# Patient Record
Sex: FEMALE
[De-identification: names, ages, dates, MRNs, and addresses within clinical notes are randomized per-mention and may not be internally consistent; named-entity substitution may affect disease eponyms.]

---

## 2023-03-02 PROBLEM — Z00.00 ENCOUNTER FOR PREVENTIVE HEALTH EXAMINATION: Status: ACTIVE | Noted: 2023-03-02

## 2023-03-08 ENCOUNTER — TRANSCRIPTION ENCOUNTER (OUTPATIENT)
Age: 73
End: 2023-03-08

## 2023-03-09 ENCOUNTER — TRANSCRIPTION ENCOUNTER (OUTPATIENT)
Age: 73
End: 2023-03-09

## 2023-03-09 ENCOUNTER — APPOINTMENT (OUTPATIENT)
Dept: CARDIOLOGY | Facility: CLINIC | Age: 73
End: 2023-03-09

## 2023-03-09 ENCOUNTER — OUTPATIENT (OUTPATIENT)
Dept: EMERGENCY DEPT | Facility: HOSPITAL | Age: 73
LOS: 1 days | End: 2023-03-09
Payer: MEDICARE

## 2023-03-09 VITALS
RESPIRATION RATE: 14 BRPM | HEART RATE: 54 BPM | TEMPERATURE: 98 F | DIASTOLIC BLOOD PRESSURE: 86 MMHG | SYSTOLIC BLOOD PRESSURE: 165 MMHG

## 2023-03-09 VITALS
RESPIRATION RATE: 12 BRPM | DIASTOLIC BLOOD PRESSURE: 63 MMHG | HEART RATE: 63 BPM | OXYGEN SATURATION: 100 % | SYSTOLIC BLOOD PRESSURE: 152 MMHG

## 2023-03-09 DIAGNOSIS — Z90.710 ACQUIRED ABSENCE OF BOTH CERVIX AND UTERUS: Chronic | ICD-10-CM

## 2023-03-09 DIAGNOSIS — H33.021 RETINAL DETACHMENT WITH MULTIPLE BREAKS, RIGHT EYE: ICD-10-CM

## 2023-03-09 DIAGNOSIS — H33.21 SEROUS RETINAL DETACHMENT, RIGHT EYE: ICD-10-CM

## 2023-03-09 LAB
ANION GAP SERPL CALC-SCNC: 8 MMOL/L — SIGNIFICANT CHANGE UP (ref 5–17)
BUN SERPL-MCNC: 14 MG/DL — SIGNIFICANT CHANGE UP (ref 7–23)
CALCIUM SERPL-MCNC: 9 MG/DL — SIGNIFICANT CHANGE UP (ref 8.4–10.5)
CHLORIDE SERPL-SCNC: 105 MMOL/L — SIGNIFICANT CHANGE UP (ref 96–108)
CO2 SERPL-SCNC: 31 MMOL/L — SIGNIFICANT CHANGE UP (ref 22–31)
CREAT SERPL-MCNC: 0.73 MG/DL — SIGNIFICANT CHANGE UP (ref 0.5–1.3)
EGFR: 87 ML/MIN/1.73M2 — SIGNIFICANT CHANGE UP
GLUCOSE SERPL-MCNC: 102 MG/DL — HIGH (ref 70–99)
HCT VFR BLD CALC: 40.8 % — SIGNIFICANT CHANGE UP (ref 34.5–45)
HGB BLD-MCNC: 13.2 G/DL — SIGNIFICANT CHANGE UP (ref 11.5–15.5)
MCHC RBC-ENTMCNC: 28.3 PG — SIGNIFICANT CHANGE UP (ref 27–34)
MCHC RBC-ENTMCNC: 32.4 GM/DL — SIGNIFICANT CHANGE UP (ref 32–36)
MCV RBC AUTO: 87.4 FL — SIGNIFICANT CHANGE UP (ref 80–100)
NRBC # BLD: 0 /100 WBCS — SIGNIFICANT CHANGE UP (ref 0–0)
PLATELET # BLD AUTO: 296 K/UL — SIGNIFICANT CHANGE UP (ref 150–400)
POTASSIUM SERPL-MCNC: 3.9 MMOL/L — SIGNIFICANT CHANGE UP (ref 3.5–5.3)
POTASSIUM SERPL-SCNC: 3.9 MMOL/L — SIGNIFICANT CHANGE UP (ref 3.5–5.3)
RBC # BLD: 4.67 M/UL — SIGNIFICANT CHANGE UP (ref 3.8–5.2)
RBC # FLD: 12.6 % — SIGNIFICANT CHANGE UP (ref 10.3–14.5)
SODIUM SERPL-SCNC: 144 MMOL/L — SIGNIFICANT CHANGE UP (ref 135–145)
WBC # BLD: 6.18 K/UL — SIGNIFICANT CHANGE UP (ref 3.8–10.5)
WBC # FLD AUTO: 6.18 K/UL — SIGNIFICANT CHANGE UP (ref 3.8–10.5)

## 2023-03-09 PROCEDURE — 36415 COLL VENOUS BLD VENIPUNCTURE: CPT

## 2023-03-09 PROCEDURE — 85027 COMPLETE CBC AUTOMATED: CPT

## 2023-03-09 PROCEDURE — 93005 ELECTROCARDIOGRAM TRACING: CPT

## 2023-03-09 PROCEDURE — 67108 REPAIR DETACHED RETINA: CPT | Mod: RT

## 2023-03-09 PROCEDURE — C1889: CPT

## 2023-03-09 PROCEDURE — C1784: CPT

## 2023-03-09 PROCEDURE — 80048 BASIC METABOLIC PNL TOTAL CA: CPT

## 2023-03-09 DEVICE — GS C3F8 PERFLUOROPROPANE IOL 2.5 L 20GM: Type: IMPLANTABLE DEVICE | Site: RIGHT | Status: FUNCTIONAL

## 2023-03-09 DEVICE — LASER PROBE 25G CONSTELLATION: Type: IMPLANTABLE DEVICE | Site: RIGHT | Status: FUNCTIONAL

## 2023-03-09 DEVICE — PERFLUORON 7ML KIT: Type: IMPLANTABLE DEVICE | Site: RIGHT | Status: FUNCTIONAL

## 2023-03-09 NOTE — H&P PST ADULT - NSICDXPASTMEDICALHX_GEN_ALL_CORE_FT
PAST MEDICAL HISTORY:  High cholesterol     History of angina     Hypertension     Ovarian cancer     Small vessel coronary artery disease

## 2023-03-09 NOTE — H&P PST ADULT - HISTORY OF PRESENT ILLNESS
this is a 74 y/o female who had shading right lower eye, diagnosed with retinal holes and retinal detachment right eye, to have repair of same

## 2023-03-09 NOTE — ASU DISCHARGE PLAN (ADULT/PEDIATRIC) - CARE PROVIDER_API CALL
Ricco Charles (MD)  Ophthalmology  [address] [place] [address], [address]  [place] [address]  Phone: [phone]  Fax: [phone]  Scheduled Appointment: 03/10/2023 01:30 PM

## 2023-03-09 NOTE — ED ADULT NURSE NOTE - LAST KNOWN WELL DATE/TIME
[facility] Xolair Allergy Control Test    Prescribing Physician:    Was patient administered Xolair on appointed administration date? [] yes [x] no    Reason for not administering Xolair :[] Illness [] No show [x] Other:    Was there any reaction to mediation? [] yes [x] no    If Yes: Reactions:      1. In the past 4 weeks, how much of the time did your asthma keep you from getting as much done as usual at work, school or at home? [] 1 [] 2 [x] 3 [] 4 [] 5   Score:_3_____    2. During the past 4 weeks, how often have you had shortness of breath? [] 1 [] 2 [x] 3 [] 4 [] 5   Score:_3_____    3. During the past four weeks, how often did your asthma symptoms (wheezing, coughing, shortness of breath, chest tightness, or pain) wake you up at night or earlier than usual in the morning? [] 1 [] 2 [] 3 [x] 4 [] 5   Score:___4___    4. During the past four weeks, how often have you used your rescue inhaler or nebulizer medication? [] 1 [] 2 [x] 3 [] 4 [] 5   Score:___3___    5. How would you rate your asthma control during the past 4 weeks? [] 1 [] 2 [x] 3 [] 4 [] 5   Score:__3____        Total Score:_____16___________        To score the Asthma control test: Each response to the 5 ACT questions has a point value from 1-5 as shown on the form. To score the ACT, add up the point value for each response to the 5 questions. 02-Mar-2023 00:00

## 2023-03-09 NOTE — H&P PST ADULT - NSANTHOSAYNRD_GEN_A_CORE
No. JAMISON screening performed.  STOP BANG Legend: 0-2 = LOW Risk; 3-4 = INTERMEDIATE Risk; 5-8 = HIGH Risk

## 2023-03-09 NOTE — ASU DISCHARGE PLAN (ADULT/PEDIATRIC) - NS MD DC FALL RISK RISK
For information on Fall & Injury Prevention, visit: [email or web address] OR  [email or web address] OR  https://www.cdc.gov/steadi/patient.html

## 2023-03-09 NOTE — ED ADULT TRIAGE NOTE - CHIEF COMPLAINT QUOTE
sent for medical clearance for right eye retinal detachment surgery, right eye shade rising on side x 1 week, right eye-20/40, left eye-20/25

## 2023-03-09 NOTE — ED PROVIDER NOTE - CLINICAL SUMMARY MEDICAL DECISION MAKING FREE TEXT BOX
Patient is a 73-year-old female who presents to the emergency room for reported retinal detachment.  Positive history of hyperlipidemia hypertension 5 disease.  Previous history of ovarian cancer status post complete hysterectomy.  Patient reports approximately 6 to 7 days ago she noted an opiate surgical in her right lower lateral visual field which has progressively worsened was seen by ophthalmology and diagnosed with a retinal detachment.  Denies headache.  Reports wears glasses denies any visual changes to left eye.  Denies chest pain shortness of breath or abdominal pain. Pt presenting to the ED for confirmed retinal detachment here for admission for operative repair. Will obtain pre op labs, EKG and will admit. Independent review of EKG reveals NSR at 66 bpm

## 2023-03-09 NOTE — ED PROVIDER NOTE - OBJECTIVE STATEMENT
Patient is a 73-year-old female who presents to the emergency room for reported retinal detachment.  Positive history of hyperlipidemia hypertension 5 disease.  Previous history of ovarian cancer status post complete hysterectomy.  Patient reports approximately 6 to 7 days ago she noted an opiate surgical in her right lower lateral visual field which has progressively worsened was seen by ophthalmology and diagnosed with a retinal detachment.  Denies headache.  Reports wears glasses denies any visual changes to left eye.  Denies chest pain shortness of breath or abdominal pain.

## 2023-03-09 NOTE — ED ADULT NURSE NOTE - NS ED NURSE RECORD ANOTHER VITAL SIGN
Plan: Start Bactrim DS take one pill twice a day for a month then continue with Minocycline 100mg one tablet twice a day Initiate Treatment: Bactrim DS take one tablet twice a day for a month Detail Level: Detailed Discontinue Regimen: Minocycline 100mg twice a day for a month Continue Regimen: Aczone 7.5% apply to face in the morning after washing\\nEpiduo forte apply to face at night after washing Yes

## 2023-08-21 ENCOUNTER — OFFICE (OUTPATIENT)
Dept: URBAN - METROPOLITAN AREA CLINIC 8 | Facility: CLINIC | Age: 73
Setting detail: OPHTHALMOLOGY
End: 2023-08-21
Payer: MEDICARE

## 2023-08-21 ENCOUNTER — RX ONLY (RX ONLY)
Age: 73
End: 2023-08-21

## 2023-08-21 DIAGNOSIS — H02.831: ICD-10-CM

## 2023-08-21 DIAGNOSIS — H25.13: ICD-10-CM

## 2023-08-21 DIAGNOSIS — H02.834: ICD-10-CM

## 2023-08-21 PROCEDURE — 99204 OFFICE O/P NEW MOD 45 MIN: CPT | Performed by: OPHTHALMOLOGY

## 2023-08-21 ASSESSMENT — REFRACTION_AUTOREFRACTION
OS_SPHERE: -0.75
OS_AXIS: 086
OD_AXIS: 064
OD_CYLINDER: -1.25
OS_CYLINDER: -0.50
OD_SPHERE: -5.25

## 2023-08-21 ASSESSMENT — REFRACTION_MANIFEST
OS_SPHERE: -1.00
OD_AXIS: 065
OD_VA1: 20/40-2
OS_AXIS: 085
OS_VA1: 20/20-1
OD_VA2: 20/30(J2)
OU_VA: 20/20-1
OS_ADD: +3.00
OS_VA2: 20/20(J1+)
OD_ADD: +3.00
OS_CYLINDER: -0.50
OD_SPHERE: -5.25
OD_CYLINDER: -1.25

## 2023-08-21 ASSESSMENT — REFRACTION_CURRENTRX
OD_CYLINDER: -0.25
OD_AXIS: 180
OD_OVR_VA: 20/
OS_VPRISM_DIRECTION: PROGS
OS_CYLINDER: -0.50
OD_ADD: +2.75
OS_AXIS: 087
OD_SPHERE: -2.25
OS_OVR_VA: 20/
OS_SPHERE: -1.50
OS_ADD: +2.75
OD_VPRISM_DIRECTION: PROGS

## 2023-08-21 ASSESSMENT — VISUAL ACUITY
OS_BCVA: 20/400
OD_BCVA: 20/20-2

## 2023-08-21 ASSESSMENT — AXIALLENGTH_DERIVED
OS_AL: 24.3799
OS_AL: 24.2763
OD_AL: 26.5852
OD_AL: 26.5852

## 2023-08-21 ASSESSMENT — SPHEQUIV_DERIVED
OS_SPHEQUIV: -1
OS_SPHEQUIV: -1.25
OD_SPHEQUIV: -5.875
OD_SPHEQUIV: -5.875

## 2023-08-21 ASSESSMENT — KERATOMETRY
OD_K2POWER_DIOPTERS: 42.75
METHOD_AUTO_MANUAL: AUTO
OD_K1POWER_DIOPTERS: 42.25
OD_AXISANGLE_DEGREES: 076
OS_K2POWER_DIOPTERS: 43.00
OS_AXISANGLE_DEGREES: 115
OS_K1POWER_DIOPTERS: 42.50

## 2023-08-21 ASSESSMENT — LID POSITION - DERMATOCHALASIS
OS_DERMATOCHALASIS: LUL 2+
OD_DERMATOCHALASIS: RUL 2+

## 2023-08-21 ASSESSMENT — CONFRONTATIONAL VISUAL FIELD TEST (CVF)
OS_FINDINGS: FULL
OD_FINDINGS: FULL

## 2023-11-01 ENCOUNTER — OFFICE (OUTPATIENT)
Dept: URBAN - METROPOLITAN AREA CLINIC 8 | Facility: CLINIC | Age: 73
Setting detail: OPHTHALMOLOGY
End: 2023-11-01
Payer: MEDICARE

## 2023-11-01 DIAGNOSIS — H25.11: ICD-10-CM

## 2023-11-01 DIAGNOSIS — H25.13: ICD-10-CM

## 2023-11-01 PROCEDURE — 99213 OFFICE O/P EST LOW 20 MIN: CPT | Performed by: OPHTHALMOLOGY

## 2023-11-01 PROCEDURE — 92136 OPHTHALMIC BIOMETRY: CPT | Mod: 26,RT | Performed by: OPHTHALMOLOGY

## 2023-11-01 PROCEDURE — 92136 OPHTHALMIC BIOMETRY: CPT | Mod: TC | Performed by: OPHTHALMOLOGY

## 2023-11-01 ASSESSMENT — REFRACTION_MANIFEST
OD_VA1: 20/40-2
OD_SPHERE: -5.25
OS_ADD: +3.00
OD_ADD: +3.00
OS_SPHERE: -1.00
OS_CYLINDER: -0.50
OD_AXIS: 065
OD_VA2: 20/30(J2)
OU_VA: 20/20-1
OS_VA1: 20/20-1
OD_CYLINDER: -1.25
OS_VA2: 20/20(J1+)
OS_AXIS: 085

## 2023-11-01 ASSESSMENT — REFRACTION_CURRENTRX
OS_ADD: +2.75
OD_VPRISM_DIRECTION: PROGS
OD_ADD: +2.75
OS_SPHERE: -1.50
OS_AXIS: 087
OS_CYLINDER: -0.50
OD_AXIS: 180
OD_OVR_VA: 20/
OS_OVR_VA: 20/
OD_CYLINDER: -0.25
OD_SPHERE: -2.25
OS_VPRISM_DIRECTION: PROGS

## 2023-11-01 ASSESSMENT — REFRACTION_AUTOREFRACTION
OD_CYLINDER: -1.00
OS_SPHERE: -1.00
OS_AXIS: 078
OD_SPHERE: -6.75
OD_AXIS: 074
OS_CYLINDER: -0.50

## 2023-11-01 ASSESSMENT — CONFRONTATIONAL VISUAL FIELD TEST (CVF)
OS_FINDINGS: FULL
OD_FINDINGS: FULL

## 2023-11-01 ASSESSMENT — SPHEQUIV_DERIVED
OS_SPHEQUIV: -1.25
OD_SPHEQUIV: -7.25
OS_SPHEQUIV: -1.25
OD_SPHEQUIV: -5.875

## 2023-11-01 ASSESSMENT — LID POSITION - DERMATOCHALASIS
OD_DERMATOCHALASIS: RUL 2+
OS_DERMATOCHALASIS: LUL 2+

## 2023-11-14 ENCOUNTER — AMBULATORY SURGERY CENTER (OUTPATIENT)
Dept: URBAN - METROPOLITAN AREA SURGERY 4 | Facility: SURGERY | Age: 73
Setting detail: OPHTHALMOLOGY
End: 2023-11-14
Payer: MEDICARE

## 2023-11-14 DIAGNOSIS — H25.11: ICD-10-CM

## 2023-11-14 PROCEDURE — 66984 XCAPSL CTRC RMVL W/O ECP: CPT | Mod: RT | Performed by: OPHTHALMOLOGY

## 2023-11-15 ENCOUNTER — RX ONLY (RX ONLY)
Age: 73
End: 2023-11-15

## 2023-11-15 ENCOUNTER — OFFICE (OUTPATIENT)
Dept: URBAN - METROPOLITAN AREA CLINIC 8 | Facility: CLINIC | Age: 73
Setting detail: OPHTHALMOLOGY
End: 2023-11-15
Payer: MEDICARE

## 2023-11-15 DIAGNOSIS — Z96.1: ICD-10-CM

## 2023-11-15 DIAGNOSIS — H25.12: ICD-10-CM

## 2023-11-15 PROCEDURE — 99024 POSTOP FOLLOW-UP VISIT: CPT | Performed by: OPHTHALMOLOGY

## 2023-11-15 ASSESSMENT — CONFRONTATIONAL VISUAL FIELD TEST (CVF)
OD_FINDINGS: FULL
OS_FINDINGS: FULL

## 2023-11-15 ASSESSMENT — CORNEAL EDEMA CLINICAL DESCRIPTION: OD_CORNEALEDEMA: T @INCISION

## 2023-11-15 ASSESSMENT — LID POSITION - DERMATOCHALASIS
OD_DERMATOCHALASIS: RUL 2+
OS_DERMATOCHALASIS: LUL 2+

## 2023-11-17 ASSESSMENT — REFRACTION_CURRENTRX
OS_ADD: +2.75
OS_AXIS: 087
OD_VPRISM_DIRECTION: PROGS
OD_AXIS: 180
OS_VPRISM_DIRECTION: PROGS
OS_CYLINDER: -0.50
OS_SPHERE: -1.50
OD_CYLINDER: -0.25
OD_ADD: +2.75
OD_SPHERE: -2.25
OD_OVR_VA: 20/
OS_OVR_VA: 20/

## 2023-11-17 ASSESSMENT — REFRACTION_MANIFEST
OS_SPHERE: -1.00
OD_SPHERE: -5.25
OD_ADD: +3.00
OS_AXIS: 085
OS_CYLINDER: -0.50
OU_VA: 20/20-1
OS_VA2: 20/20(J1+)
OS_VA1: 20/20-1
OS_ADD: +3.00
OD_VA2: 20/30(J2)
OD_VA1: 20/40-2
OD_AXIS: 065
OD_CYLINDER: -1.25

## 2023-11-17 ASSESSMENT — REFRACTION_AUTOREFRACTION
OS_AXIS: 078
OS_SPHERE: -1.00
OD_AXIS: 074
OD_SPHERE: -6.75
OD_CYLINDER: -1.00
OS_CYLINDER: -0.50

## 2023-11-17 ASSESSMENT — SPHEQUIV_DERIVED
OS_SPHEQUIV: -1.25
OD_SPHEQUIV: -7.25
OD_SPHEQUIV: -5.875
OS_SPHEQUIV: -1.25

## 2023-11-18 RX ORDER — ESTROGENS, CONJUGATED 0.625 MG
1 TABLET ORAL
Qty: 0 | Refills: 0 | DISCHARGE

## 2023-11-18 RX ORDER — LOSARTAN/HYDROCHLOROTHIAZIDE 100MG-25MG
1 TABLET ORAL
Qty: 0 | Refills: 0 | DISCHARGE

## 2023-11-18 RX ORDER — ATORVASTATIN CALCIUM 80 MG/1
1 TABLET, FILM COATED ORAL
Qty: 0 | Refills: 0 | DISCHARGE

## 2023-11-18 RX ORDER — METOPROLOL TARTRATE 50 MG
1 TABLET ORAL
Qty: 0 | Refills: 0 | DISCHARGE

## 2023-12-06 ENCOUNTER — OFFICE (OUTPATIENT)
Dept: URBAN - METROPOLITAN AREA CLINIC 8 | Facility: CLINIC | Age: 73
Setting detail: OPHTHALMOLOGY
End: 2023-12-06
Payer: MEDICARE

## 2023-12-06 DIAGNOSIS — Z96.1: ICD-10-CM

## 2023-12-06 DIAGNOSIS — H25.12: ICD-10-CM

## 2023-12-06 DIAGNOSIS — H02.831: ICD-10-CM

## 2023-12-06 DIAGNOSIS — H02.834: ICD-10-CM

## 2023-12-06 PROBLEM — H43.391 FLOATERS; RIGHT EYE: Status: ACTIVE | Noted: 2023-12-06

## 2023-12-06 PROCEDURE — 99024 POSTOP FOLLOW-UP VISIT: CPT | Performed by: OPHTHALMOLOGY

## 2023-12-06 ASSESSMENT — REFRACTION_AUTOREFRACTION
OS_CYLINDER: -0.50
OD_SPHERE: -6.75
OS_SPHERE: -1.00
OS_AXIS: 078
OD_AXIS: 175
OD_CYLINDER: -1.00

## 2023-12-06 ASSESSMENT — REFRACTION_MANIFEST
OS_CYLINDER: -0.50
OD_ADD: +2.25
OU_VA: 20/25
OS_AXIS: 75
OS_ADD: +2.25
OS_VA1: 20/25
OD_AXIS: 175
OS_SPHERE: -1.50
OS_VA1: 20/25
OD_VA1: 20/20
OD_SPHERE: PLANO
OS_CYLINDER: -0.50
OD_CYLINDER: -0.75
OS_SPHERE: -1.50
OD_VA1: 20/20
OD_AXIS: 175
OS_AXIS: 75
OD_CYLINDER: -0.75
OD_SPHERE: PLANO
OU_VA: 20/25

## 2023-12-06 ASSESSMENT — SPHEQUIV_DERIVED
OS_SPHEQUIV: -1.75
OD_SPHEQUIV: -7.25
OS_SPHEQUIV: -1.75
OS_SPHEQUIV: -1.25

## 2023-12-06 ASSESSMENT — REFRACTION_CURRENTRX
OS_AXIS: 074
OD_ADD: +2.50
OS_SPHERE: -1.50
OD_OVR_VA: 20/
OS_ADD: +2.50
OD_CYLINDER: SPHERE
OD_VPRISM_DIRECTION: PROGS
OS_OVR_VA: 20/
OD_SPHERE: -2.25
OS_VPRISM_DIRECTION: PROGS
OS_CYLINDER: -0.50

## 2023-12-06 ASSESSMENT — LID POSITION - DERMATOCHALASIS
OS_DERMATOCHALASIS: LUL 2+
OD_DERMATOCHALASIS: RUL 2+

## 2023-12-06 ASSESSMENT — CONFRONTATIONAL VISUAL FIELD TEST (CVF)
OS_FINDINGS: FULL
OD_FINDINGS: FULL

## 2024-04-29 ENCOUNTER — OFFICE (OUTPATIENT)
Dept: URBAN - METROPOLITAN AREA CLINIC 8 | Facility: CLINIC | Age: 74
Setting detail: OPHTHALMOLOGY
End: 2024-04-29
Payer: MEDICARE

## 2024-04-29 DIAGNOSIS — H02.834: ICD-10-CM

## 2024-04-29 DIAGNOSIS — Z96.1: ICD-10-CM

## 2024-04-29 DIAGNOSIS — H02.831: ICD-10-CM

## 2024-04-29 DIAGNOSIS — H25.12: ICD-10-CM

## 2024-04-29 PROCEDURE — 99213 OFFICE O/P EST LOW 20 MIN: CPT | Performed by: OPHTHALMOLOGY

## 2024-04-29 ASSESSMENT — LID POSITION - DERMATOCHALASIS
OS_DERMATOCHALASIS: LUL 2+
OD_DERMATOCHALASIS: RUL 2+

## 2024-12-26 NOTE — ASU PATIENT PROFILE, ADULT - PRO ARRIVE FROM
Assumed care of pt at 2200. Pt is A&Ox4. Respirations are even and unlabored. Pt present to the ED for abdominal pain. Pt reports since yesterday she has been having epigastric upper abdominal pain that worsens when she lies down. Pt states she vomited once yesterday. Denies chest pain, SOB, fever and urinary symtpoms. ED provider evaluating, plan of care ongoing. home

## 2025-07-30 ENCOUNTER — OFFICE (OUTPATIENT)
Dept: URBAN - METROPOLITAN AREA CLINIC 8 | Facility: CLINIC | Age: 75
Setting detail: OPHTHALMOLOGY
End: 2025-07-30
Payer: MEDICARE

## 2025-07-30 DIAGNOSIS — H02.831: ICD-10-CM

## 2025-07-30 DIAGNOSIS — H43.391: ICD-10-CM

## 2025-07-30 DIAGNOSIS — H02.834: ICD-10-CM

## 2025-07-30 DIAGNOSIS — Z96.1: ICD-10-CM

## 2025-07-30 DIAGNOSIS — H25.12: ICD-10-CM

## 2025-07-30 PROCEDURE — 92014 COMPRE OPH EXAM EST PT 1/>: CPT | Performed by: OPHTHALMOLOGY

## 2025-07-30 ASSESSMENT — REFRACTION_AUTOREFRACTION
OS_SPHERE: -1.25
OD_CYLINDER: -0.75
OD_SPHERE: +0.25
OD_AXIS: 168
OS_CYLINDER: -0.50
OS_AXIS: 102

## 2025-07-30 ASSESSMENT — REFRACTION_CURRENTRX
OS_OVR_VA: 20/
OD_ADD: +2.25
OS_VPRISM_DIRECTION: PROGS
OD_OVR_VA: 20/
OD_SPHERE: PLANO
OS_ADD: +2.25
OS_SPHERE: -1.50
OS_CYLINDER: -0.50
OD_AXIS: 107
OD_VPRISM_DIRECTION: PROGS
OS_AXIS: 077
OD_CYLINDER: -0.75

## 2025-07-30 ASSESSMENT — KERATOMETRY
OD_AXISANGLE_DEGREES: 085
OS_K1POWER_DIOPTERS: 42.50
OS_AXISANGLE_DEGREES: 135
OS_K2POWER_DIOPTERS: 43.00
METHOD_AUTO_MANUAL: AUTO
OD_K1POWER_DIOPTERS: 42.25
OD_K2POWER_DIOPTERS: 43.60

## 2025-07-30 ASSESSMENT — REFRACTION_MANIFEST
OD_CYLINDER: -0.75
OS_AXIS: 75
OS_CYLINDER: -0.50
OD_VA1: 20/20
OD_ADD: +2.25
OS_CYLINDER: -0.50
OS_SPHERE: -1.50
OS_AXIS: 75
OD_VA1: 20/20
OU_VA: 20/25
OS_VA1: 20/25
OD_SPHERE: PLANO
OU_VA: 20/25
OD_AXIS: 175
OD_SPHERE: PLANO
OS_VA1: 20/50
OD_AXIS: 175
OD_CYLINDER: -0.75
OS_ADD: +2.25
OS_SPHERE: -1.50

## 2025-07-30 ASSESSMENT — CONFRONTATIONAL VISUAL FIELD TEST (CVF)
OS_FINDINGS: FULL
OD_FINDINGS: FULL

## 2025-07-30 ASSESSMENT — VISUAL ACUITY
OD_BCVA: 20/50
OS_BCVA: 20/20

## 2025-07-30 ASSESSMENT — LID POSITION - DERMATOCHALASIS
OD_DERMATOCHALASIS: RUL 2+
OS_DERMATOCHALASIS: LUL 2+

## (undated) DEVICE — WARMING BLANKET LOWER ADULT

## (undated) DEVICE — DRAPE STERI-DRAPE INCISE 13X13"

## (undated) DEVICE — SOL IRR BAL SALT + 500ML

## (undated) DEVICE — PROBE DSP DIATHERMY 27PLUS

## (undated) DEVICE — LIGHT SHIELD CORNEAL

## (undated) DEVICE — DRSG MASTISOL

## (undated) DEVICE — ELCTR BIPOLAR CORD J&J 12FT DISP

## (undated) DEVICE — PACK VITRECTOMY

## (undated) DEVICE — NDL HYPO SAFE 22G X 1.5" (BLACK)

## (undated) DEVICE — SUT VICRYL 7-0 12" TG140-8 DA

## (undated) DEVICE — VENODYNE/SCD SLEEVE CALF MEDIUM

## (undated) DEVICE — CANNULA MEDONE FLEXTIP 25G

## (undated) DEVICE — CONSTELLATION TOTAL PLUS PAK 23G

## (undated) DEVICE — SYE-LASER - CONSTELLATION MACHINE #2 1003466901X: Type: DURABLE MEDICAL EQUIPMENT

## (undated) DEVICE — AUTO GAS FILL CONSTELLATION

## (undated) DEVICE — GLV 7 PROTEXIS (WHITE)